# Patient Record
Sex: MALE | Race: BLACK OR AFRICAN AMERICAN | NOT HISPANIC OR LATINO
[De-identification: names, ages, dates, MRNs, and addresses within clinical notes are randomized per-mention and may not be internally consistent; named-entity substitution may affect disease eponyms.]

---

## 2022-12-04 ENCOUNTER — NON-APPOINTMENT (OUTPATIENT)
Age: 37
End: 2022-12-04

## 2023-01-26 ENCOUNTER — NON-APPOINTMENT (OUTPATIENT)
Age: 38
End: 2023-01-26

## 2023-11-15 ENCOUNTER — APPOINTMENT (OUTPATIENT)
Dept: ORTHOPEDIC SURGERY | Facility: CLINIC | Age: 38
End: 2023-11-15
Payer: OTHER MISCELLANEOUS

## 2023-11-15 ENCOUNTER — NON-APPOINTMENT (OUTPATIENT)
Age: 38
End: 2023-11-15

## 2023-11-15 VITALS — BODY MASS INDEX: 26.6 KG/M2 | WEIGHT: 190 LBS | HEIGHT: 71 IN

## 2023-11-15 DIAGNOSIS — I10 ESSENTIAL (PRIMARY) HYPERTENSION: ICD-10-CM

## 2023-11-15 DIAGNOSIS — M25.462 EFFUSION, LEFT KNEE: ICD-10-CM

## 2023-11-15 DIAGNOSIS — S83.8X2A SPRAIN OF OTHER SPECIFIED PARTS OF LEFT KNEE, INITIAL ENCOUNTER: ICD-10-CM

## 2023-11-15 PROBLEM — Z00.00 ENCOUNTER FOR PREVENTIVE HEALTH EXAMINATION: Status: ACTIVE | Noted: 2023-11-15

## 2023-11-15 PROCEDURE — L1833: CPT | Mod: LT

## 2023-11-15 PROCEDURE — 76882 US LMTD JT/FCL EVL NVASC XTR: CPT | Mod: 59

## 2023-11-15 PROCEDURE — 99204 OFFICE O/P NEW MOD 45 MIN: CPT

## 2023-11-15 PROCEDURE — 73564 X-RAY EXAM KNEE 4 OR MORE: CPT | Mod: LT

## 2023-11-15 PROCEDURE — 20611 DRAIN/INJ JOINT/BURSA W/US: CPT

## 2023-11-15 RX ORDER — AMLODIPINE BESYLATE 10 MG/1
10 TABLET ORAL
Refills: 0 | Status: ACTIVE | COMMUNITY

## 2023-11-28 ENCOUNTER — APPOINTMENT (OUTPATIENT)
Dept: MRI IMAGING | Facility: CLINIC | Age: 38
End: 2023-11-28
Payer: OTHER MISCELLANEOUS

## 2023-11-28 PROCEDURE — 73723 MRI JOINT LWR EXTR W/O&W/DYE: CPT | Mod: LT

## 2023-11-28 PROCEDURE — 27369Z: CUSTOM | Mod: 1L,LT

## 2023-11-28 PROCEDURE — 27369Z: CUSTOM | Mod: LT

## 2023-11-28 PROCEDURE — 73723 MRI JOINT LWR EXTR W/O&W/DYE: CPT | Mod: 1L,LT

## 2023-11-30 ENCOUNTER — APPOINTMENT (OUTPATIENT)
Dept: ORTHOPEDIC SURGERY | Facility: CLINIC | Age: 38
End: 2023-11-30
Payer: OTHER MISCELLANEOUS

## 2023-11-30 VITALS — WEIGHT: 190 LBS | BODY MASS INDEX: 26.6 KG/M2 | HEIGHT: 71 IN

## 2023-11-30 PROCEDURE — L1852: CPT | Mod: LT

## 2023-11-30 PROCEDURE — 99214 OFFICE O/P EST MOD 30 MIN: CPT

## 2023-12-21 ENCOUNTER — APPOINTMENT (OUTPATIENT)
Dept: ORTHOPEDIC SURGERY | Facility: CLINIC | Age: 38
End: 2023-12-21

## 2024-01-03 ENCOUNTER — APPOINTMENT (OUTPATIENT)
Dept: ORTHOPEDIC SURGERY | Facility: CLINIC | Age: 39
End: 2024-01-03
Payer: OTHER MISCELLANEOUS

## 2024-01-03 PROCEDURE — 99214 OFFICE O/P EST MOD 30 MIN: CPT

## 2024-01-03 NOTE — WORK
[Total (100%)] : total (100%) [Reveals pre-existing condition(s) that may affect treatment/prognosis] : reveals pre-existing condition(s) that may affect treatment/prognosis [Cannot return to work because ________] : cannot return to work because [unfilled] [Unknown at this time] : : unknown at this time [Patient] : patient [No Rx restrictions] : No Rx restrictions. [I provided the services listed above] :  I provided the services listed above. [FreeTextEntry1] : guarded needs surgery [FreeTextEntry2] : see HPI

## 2024-01-03 NOTE — HISTORY OF PRESENT ILLNESS
[Work related] : work related [Gradual] : gradual [7] : 7 [3] : 3 [Dull/Aching] : dull/aching [Throbbing] : throbbing [Constant] : constant [Rest] : rest [Not working due to injury] : Work status: not working due to injury [de-identified] : Patient has persistent symptoms in his left knee,  he has pain pivoting motions, stiffness fully extending the knee. We are awaiting auth for surgery left knee. He is wearing his ACL functional brace.  [] : no [FreeTextEntry1] : left knee  [FreeTextEntry3] : 11/12/23 [FreeTextEntry5] : patient was going to stop the elevator when he planted his foot on the ground and he felt his knee shift.  [FreeTextEntry6] : swelling.  [FreeTextEntry9] : brace  [de-identified] : bending the knee,

## 2024-01-03 NOTE — DISCUSSION/SUMMARY
[de-identified] : request comp auth for, surg disccsion questions answered, no guarantees and cpm post-op ice  mod activity  01/03/2024    RE:  SERGIO BECERRA   Acct #- [id number]    Attention:  Nurse Reviewer /Medical Director  I am writing this letter as a medical necessity for PT program. Patient has tried analgesics, non-steroid anti-inflammatory agents,  hot or cold compresses,injections of corticosteroids, etc)  which in combination or by themselves has not worked. Based on my patient's condition, I strongly believe that the PT is medically needed.   Thank you for your time and consideration.

## 2024-01-03 NOTE — PHYSICAL EXAM
[Left] : left knee [5___] : hamstring 5[unfilled]/5 [Positive] : positive Susana [] : patient ambulates with assistive device [de-identified] : knee immobilizer [TWNoteComboBox7] : flexion 110 degrees [de-identified] : extension 0 degrees

## 2024-02-05 ENCOUNTER — NON-APPOINTMENT (OUTPATIENT)
Age: 39
End: 2024-02-05

## 2024-02-05 ENCOUNTER — APPOINTMENT (OUTPATIENT)
Dept: ORTHOPEDIC SURGERY | Facility: CLINIC | Age: 39
End: 2024-02-05
Payer: OTHER MISCELLANEOUS

## 2024-02-05 VITALS — WEIGHT: 190 LBS | BODY MASS INDEX: 26.6 KG/M2 | HEIGHT: 71 IN

## 2024-02-05 PROCEDURE — 99213 OFFICE O/P EST LOW 20 MIN: CPT

## 2024-02-05 NOTE — HISTORY OF PRESENT ILLNESS
[de-identified] : Patient has persistent symptoms in his left knee,  he has pain pivoting motions, stiffness fully extending the knee. We are awaiting auth for surgery left knee. He is wearing his ACL functional brace.  No authorization as of yet.  [FreeTextEntry5] : Patient is here to follow up on left knee. Attending PT with improvement. Still experiences discomfort with prolonged standing/walking. Currently not working.

## 2024-02-05 NOTE — DISCUSSION/SUMMARY
[de-identified] : request comp auth for, surg disccsion questions answered, no guarantees and cpm post-op ice  mod activity  2/5/2024   RE:  SERGIO BECERRA   Acct #- [id number]    Attention:  Nurse Reviewer /Medical Director  I am writing this letter as a medical necessity for PT program. Patient has tried analgesics, non-steroid anti-inflammatory agents,  hot or cold compresses,injections of corticosteroids, etc)  which in combination or by themselves has not worked. Based on my patient's condition, I strongly believe that the PT is medically needed.   Thank you for your time and consideration.

## 2024-02-05 NOTE — PHYSICAL EXAM
[Left] : left knee [5___] : hamstring 5[unfilled]/5 [Positive] : positive Susana [] : patient ambulates with assistive device [de-identified] : knee immobilizer [TWNoteComboBox7] : flexion 110 degrees [de-identified] : extension 0 degrees

## 2024-03-04 ENCOUNTER — APPOINTMENT (OUTPATIENT)
Dept: ORTHOPEDIC SURGERY | Facility: CLINIC | Age: 39
End: 2024-03-04
Payer: OTHER MISCELLANEOUS

## 2024-03-04 ENCOUNTER — NON-APPOINTMENT (OUTPATIENT)
Age: 39
End: 2024-03-04

## 2024-03-04 PROCEDURE — 99214 OFFICE O/P EST MOD 30 MIN: CPT

## 2024-03-04 NOTE — PHYSICAL EXAM
[Left] : left knee [5___] : hamstring 5[unfilled]/5 [Positive] : positive Susana [] : no calf tenderness [de-identified] : knee immobilizer [de-identified] : extension 0 degrees [TWNoteComboBox7] : flexion 110 degrees

## 2024-03-04 NOTE — DISCUSSION/SUMMARY
[de-identified] : request comp auth for, surg L knee acl recon and med and lat meniscectomies ice  mod activity request comp auth for PT  3/4/2024   RE:  SERGIO BECERRA   Acct #- [id number]    Attention:  Nurse Reviewer /Medical Director  I am writing this letter as a medical necessity for PT program. Patient has tried analgesics, non-steroid anti-inflammatory agents,  hot or cold compresses,injections of corticosteroids, etc)  which in combination or by themselves has not worked. Based on my patient's condition, I strongly believe that the PT is medically needed.   Thank you for your time and consideration.

## 2024-03-04 NOTE — HISTORY OF PRESENT ILLNESS
[Gradual] : gradual [Sudden] : sudden [6] : 6 [5] : 5 [Stabbing] : stabbing [Intermittent] : intermittent [Rest] : rest [Exercising] : exercising [Not working due to injury] : Work status: not working due to injury [] : yes [de-identified] : Patient has persistent but tolerable symptoms in his left knee,. We are awaiting auth for surgery left knee. He is wearing his ACL functional brace.  No authorization as of yet. Has known MMT and LMT and acl tear [FreeTextEntry5] : Patient is here to follow up on left knee. Attending PT with improvement. Still experiences discomfort with prolonged standing/walking. Currently not working.  [FreeTextEntry3] : 11/12/23 [de-identified] :

## 2024-04-08 ENCOUNTER — NON-APPOINTMENT (OUTPATIENT)
Age: 39
End: 2024-04-08

## 2024-04-08 ENCOUNTER — APPOINTMENT (OUTPATIENT)
Dept: ORTHOPEDIC SURGERY | Facility: CLINIC | Age: 39
End: 2024-04-08
Payer: OTHER MISCELLANEOUS

## 2024-04-08 PROCEDURE — 99214 OFFICE O/P EST MOD 30 MIN: CPT

## 2024-04-08 NOTE — PHYSICAL EXAM
[Left] : left knee [5___] : hamstring 5[unfilled]/5 [Positive] : positive Susana [] : patient ambulates with assistive device [de-identified] : knee immobilizer [TWNoteComboBox7] : flexion 110 degrees [de-identified] : extension 0 degrees

## 2024-04-08 NOTE — DISCUSSION/SUMMARY
[de-identified] : request comp auth for, surg L knee acl recon and med and lat meniscectomies ice  mod activity request comp auth for PT to help increase strength LLE,  improve ADL's, function.  Continue ACL functional brace.    4/8/2024   RE:  SERGIO BECERRA   Acct #- [id number]    Attention:  Nurse Reviewer /Medical Director  I am writing this letter as a medical necessity for PT program. Patient has tried analgesics, non-steroid anti-inflammatory agents,  hot or cold compresses,injections of corticosteroids, etc)  which in combination or by themselves has not worked. Based on my patient's condition, I strongly believe that the PT is medically needed.   Thank you for your time and consideration.

## 2024-04-08 NOTE — HISTORY OF PRESENT ILLNESS
[Gradual] : gradual [Sudden] : sudden [6] : 6 [5] : 5 [Stabbing] : stabbing [Intermittent] : intermittent [Rest] : rest [Exercising] : exercising [Not working due to injury] : Work status: not working due to injury [] : yes [de-identified] : Patient has persistent but tolerable symptoms in his left knee. We are awaiting auth for surgery left knee. He is wearing his ACL functional brace.  No authorization as of yet. Has known MMT and LMT and acl tear. He has not been attending PT program. He was injured at work on 11/12/23, ACL tear, MMT, LMT. He has attended PT program and remains symptomatic. Has episodes of instability, still trouble with pivoting, feels weaker on the left side.  [FreeTextEntry3] : 11/12/23 [FreeTextEntry5] : Patient is here to follow up on left knee. Attending PT with improvement. Still experiences discomfort with prolonged standing/walking. Currently not working.  [de-identified] :

## 2024-05-06 ENCOUNTER — NON-APPOINTMENT (OUTPATIENT)
Age: 39
End: 2024-05-06

## 2024-05-06 ENCOUNTER — APPOINTMENT (OUTPATIENT)
Dept: ORTHOPEDIC SURGERY | Facility: CLINIC | Age: 39
End: 2024-05-06
Payer: OTHER MISCELLANEOUS

## 2024-05-06 VITALS — HEIGHT: 71 IN | BODY MASS INDEX: 26.6 KG/M2 | WEIGHT: 190 LBS

## 2024-05-06 DIAGNOSIS — S83.272A COMPLEX TEAR OF LATERAL MENISCUS, CURRENT INJURY, LEFT KNEE, INITIAL ENCOUNTER: ICD-10-CM

## 2024-05-06 PROCEDURE — 99214 OFFICE O/P EST MOD 30 MIN: CPT

## 2024-05-06 NOTE — DISCUSSION/SUMMARY
[de-identified] : request comp auth for, surg L knee acl recon and med and lat meniscectomies ice  mod activity request comp auth for PT to help increase strength LLE,  improve ADL's, function.  Continue ACL functional brace.   05/06/2024    RE:  SERGIO BECERRA   Acct #- [id number]    Attention:  Nurse Reviewer /Medical Director  I am writing this letter as a medical necessity for PT program. Patient has tried analgesics, non-steroid anti-inflammatory agents,  hot or cold compresses,injections of corticosteroids, etc)  which in combination or by themselves has not worked. Based on my patient's condition, I strongly believe that the PT is medically needed.   Thank you for your time and consideration.

## 2024-05-06 NOTE — PHYSICAL EXAM
[Left] : left knee [5___] : hamstring 5[unfilled]/5 [Positive] : positive Susana [] : patient ambulates with assistive device [de-identified] : knee immobilizer [TWNoteComboBox7] : flexion 110 degrees [de-identified] : extension 0 degrees

## 2024-05-06 NOTE — HISTORY OF PRESENT ILLNESS
[6] : 6 [1] : 2 [Dull/Aching] : dull/aching [Frequent] : frequent [Leisure] : leisure [Rest] : rest [Ice] : ice [Walking/activity] : walking/activity [Standing] : standing [Walking] : walking [Exercising] : exercising [Stairs] : stairs [de-identified] : DOA 11/12/23  Patient has persistent symptoms left knee, MMT, LMT, ACL tear. Not in PT as insurance discontinued, surgery has not been authorized. His knee is swollen today, walking for shorter distances increases the discomfort. Surgery not approved. Wears ACL functional brace when exercising.  [] : no [FreeTextEntry1] : Left Knee [de-identified] : 04/08/24 [de-identified] : Dr. Fisher

## 2024-05-14 ENCOUNTER — APPOINTMENT (OUTPATIENT)
Age: 39
End: 2024-05-14
Payer: OTHER MISCELLANEOUS

## 2024-05-14 PROCEDURE — 29880 ARTHRS KNE SRG MNISECTMY M&L: CPT | Mod: AS,59,RT

## 2024-05-14 PROCEDURE — 29888 ARTHRS AID ACL RPR/AGMNTJ: CPT | Mod: LT

## 2024-05-14 PROCEDURE — 29880 ARTHRS KNE SRG MNISECTMY M&L: CPT | Mod: 59,LT

## 2024-05-14 PROCEDURE — 29875 ARTHRS KNEE SURG SYNVCT LMTD: CPT | Mod: 59,LT

## 2024-05-14 PROCEDURE — 29888 ARTHRS AID ACL RPR/AGMNTJ: CPT | Mod: AS,RT

## 2024-05-14 PROCEDURE — 29875 ARTHRS KNEE SURG SYNVCT LMTD: CPT | Mod: AS,59,RT

## 2024-05-14 RX ORDER — OXYCODONE AND ACETAMINOPHEN 5; 325 MG/1; MG/1
5-325 TABLET ORAL
Qty: 40 | Refills: 0 | Status: ACTIVE | COMMUNITY
Start: 2024-05-14 | End: 1900-01-01

## 2024-05-22 ENCOUNTER — NON-APPOINTMENT (OUTPATIENT)
Age: 39
End: 2024-05-22

## 2024-05-22 ENCOUNTER — APPOINTMENT (OUTPATIENT)
Dept: ORTHOPEDIC SURGERY | Facility: CLINIC | Age: 39
End: 2024-05-22
Payer: OTHER MISCELLANEOUS

## 2024-05-22 DIAGNOSIS — S83.272D COMPLEX TEAR OF LATERAL MENISCUS, CURRENT INJURY, LEFT KNEE, SUBSEQUENT ENCOUNTER: ICD-10-CM

## 2024-05-22 DIAGNOSIS — S83.512D SPRAIN OF ANTERIOR CRUCIATE LIGAMENT OF LEFT KNEE, SUBSEQUENT ENCOUNTER: ICD-10-CM

## 2024-05-22 PROCEDURE — 73560 X-RAY EXAM OF KNEE 1 OR 2: CPT | Mod: LT

## 2024-05-22 PROCEDURE — 20610 DRAIN/INJ JOINT/BURSA W/O US: CPT | Mod: 58,LT

## 2024-05-22 PROCEDURE — 99024 POSTOP FOLLOW-UP VISIT: CPT

## 2024-05-22 NOTE — HISTORY OF PRESENT ILLNESS
[de-identified] : Patient s/p left knee arthroscopic ACL reconstruction with patellar tendon autograft, partial medial and lateral meniscectomy on 5/14/24. The patient denies fever/chills. No calf pain. PAin is manageable. At 90 degrees on the CPM.

## 2024-05-22 NOTE — PHYSICAL EXAM
[Left] : left knee [] : patient ambulates with assistive device [TWNoteComboBox7] : flexion 90 degrees [de-identified] : extension 0 degrees

## 2024-05-22 NOTE — DISCUSSION/SUMMARY
[de-identified] : Surgical findings reviewed with the patient. Start PT Program, HEP and CPM use reviewed.  05/22/2024    RE:  SERGIO BECERRA   Acct #- [id number]    Attention:  Nurse Reviewer /Medical Director  I am writing this letter as a medical necessity for PT program. Patient has tried analgesics, non-steroid anti-inflammatory agents,  hot or cold compresses,injections of corticosteroids, etc)  which in combination or by themselves has not worked. Based on my patient's condition, I strongly believe that the PT is medically needed.   Thank you for your time and consideration.

## 2024-05-22 NOTE — PROCEDURE
[Large Joint Injection] : Large joint injection [Left] : of the left [Knee] : knee [Pain] : pain [Inflammation] : inflammation [Betadine] : betadine [___ cc    0.25%] : Bupivacaine (Marcaine) ~Vcc of 0.25%  [Effusion] : effusion [] : Patient tolerated procedure well [Call if redness, pain or fever occur] : call if redness, pain or fever occur [Apply ice for 15min out of every hour for the next 12-24 hours as tolerated] : apply ice for 15 minutes out of every hour for the next 12-24 hours as tolerated [Patient was advised to rest the joint(s) for ____ days] : patient was advised to rest the joint(s) for [unfilled] days [de-identified] : 55 ccs [de-identified] : heme

## 2024-05-22 NOTE — WORK
[Total (100%)] : total (100%) [Does not reveal pre-existing condition(s) that may affect treatment/prognosis] : does not reveal pre-existing condition(s) that may affect treatment/prognosis [Cannot return to work because ________] : cannot return to work because [unfilled] [Unknown at this time] : : unknown at this time [Patient] : patient [No Rx restrictions] : No Rx restrictions. [I provided the services listed above] :  I provided the services listed above. [FreeTextEntry1] : good with PT

## 2024-06-03 ENCOUNTER — APPOINTMENT (OUTPATIENT)
Dept: ORTHOPEDIC SURGERY | Facility: CLINIC | Age: 39
End: 2024-06-03

## 2024-06-26 ENCOUNTER — NON-APPOINTMENT (OUTPATIENT)
Age: 39
End: 2024-06-26

## 2024-06-26 ENCOUNTER — APPOINTMENT (OUTPATIENT)
Dept: ORTHOPEDIC SURGERY | Facility: CLINIC | Age: 39
End: 2024-06-26
Payer: OTHER MISCELLANEOUS

## 2024-06-26 VITALS — WEIGHT: 190 LBS | BODY MASS INDEX: 26.6 KG/M2 | HEIGHT: 71 IN

## 2024-06-26 DIAGNOSIS — S83.242A OTHER TEAR OF MEDIAL MENISCUS, CURRENT INJURY, LEFT KNEE, INITIAL ENCOUNTER: ICD-10-CM

## 2024-06-26 DIAGNOSIS — S83.512A SPRAIN OF ANTERIOR CRUCIATE LIGAMENT OF LEFT KNEE, INITIAL ENCOUNTER: ICD-10-CM

## 2024-06-26 DIAGNOSIS — S83.242D OTHER TEAR OF MEDIAL MENISCUS, CURRENT INJURY, LEFT KNEE, SUBSEQUENT ENCOUNTER: ICD-10-CM

## 2024-06-26 PROCEDURE — 99024 POSTOP FOLLOW-UP VISIT: CPT

## 2024-08-14 ENCOUNTER — APPOINTMENT (OUTPATIENT)
Dept: ORTHOPEDIC SURGERY | Facility: CLINIC | Age: 39
End: 2024-08-14
Payer: OTHER MISCELLANEOUS

## 2024-08-14 VITALS — WEIGHT: 190 LBS | HEIGHT: 71 IN | BODY MASS INDEX: 26.6 KG/M2

## 2024-08-14 DIAGNOSIS — S83.242D OTHER TEAR OF MEDIAL MENISCUS, CURRENT INJURY, LEFT KNEE, SUBSEQUENT ENCOUNTER: ICD-10-CM

## 2024-08-14 DIAGNOSIS — S83.512D SPRAIN OF ANTERIOR CRUCIATE LIGAMENT OF LEFT KNEE, SUBSEQUENT ENCOUNTER: ICD-10-CM

## 2024-08-14 DIAGNOSIS — S83.272D COMPLEX TEAR OF LATERAL MENISCUS, CURRENT INJURY, LEFT KNEE, SUBSEQUENT ENCOUNTER: ICD-10-CM

## 2024-08-14 PROCEDURE — 99213 OFFICE O/P EST LOW 20 MIN: CPT

## 2024-08-14 NOTE — PHYSICAL EXAM
[Left] : left knee [] : ambulation with crutches [TWNoteComboBox7] : flexion 110 degrees [de-identified] : extension 0 degrees

## 2024-08-14 NOTE — HISTORY OF PRESENT ILLNESS
[Work related] : work related [Sudden] : sudden [0] : 0 [Dull/Aching] : dull/aching [Intermittent] : intermittent [Rest] : rest [Stairs] : stairs [Not working due to injury] : Work status: not working due to injury [de-identified] : Patient s/p left knee arthroscopic ACL reconstruction with patellar tendon autograft, partial medial and lateral meniscectomy on 5/14/24. The patient is in PT and uses ACL functional brace, doing HEP.  [] : no [FreeTextEntry1] : LT KNEE  [FreeTextEntry3] : 11/12/23 [FreeTextEntry5] : Patient feeling a lot better.  [de-identified] : 05/14/24 [de-identified] : partial medial and lateral meniscectomy

## 2024-08-14 NOTE — DISCUSSION/SUMMARY
[de-identified] : PT program, advance   8/14/24   RE:  SERGIO BECERRA   [id number]    Attention:  Nurse Reviewer /Medical Director  I am writing this letter as a medical necessity for PT program. Patient has tried analgesics, non-steroid anti-inflammatory agents,  hot or cold compresses,injections of corticosteroids, etc)  which in combination or by themselves has not worked. Based on my patient's condition, I strongly believe that the PT is medically needed.   Thank you for your time and consideration.

## 2024-09-11 ENCOUNTER — NON-APPOINTMENT (OUTPATIENT)
Age: 39
End: 2024-09-11

## 2024-09-11 ENCOUNTER — APPOINTMENT (OUTPATIENT)
Dept: ORTHOPEDIC SURGERY | Facility: CLINIC | Age: 39
End: 2024-09-11
Payer: OTHER MISCELLANEOUS

## 2024-09-11 VITALS — HEIGHT: 71 IN | WEIGHT: 190 LBS | BODY MASS INDEX: 26.6 KG/M2

## 2024-09-11 DIAGNOSIS — S83.242A OTHER TEAR OF MEDIAL MENISCUS, CURRENT INJURY, LEFT KNEE, INITIAL ENCOUNTER: ICD-10-CM

## 2024-09-11 DIAGNOSIS — S83.512A SPRAIN OF ANTERIOR CRUCIATE LIGAMENT OF LEFT KNEE, INITIAL ENCOUNTER: ICD-10-CM

## 2024-09-11 DIAGNOSIS — S83.272A COMPLEX TEAR OF LATERAL MENISCUS, CURRENT INJURY, LEFT KNEE, INITIAL ENCOUNTER: ICD-10-CM

## 2024-09-11 PROCEDURE — 99214 OFFICE O/P EST MOD 30 MIN: CPT

## 2024-09-11 NOTE — DISCUSSION/SUMMARY
[de-identified] : PT program, advance   9/11/24   RE[facility]  [facility] BECERRA   [id number]    Attention:  Nurse Reviewer /Medical Director  I am writing this letter as a medical necessity for PT program. Patient has tried analgesics, non-steroid anti-inflammatory agents,  hot or cold compresses,injections of corticosteroids, etc)  which in combination or by themselves has not worked. Based on my patient's condition, I strongly believe that the PT is medically needed.   Thank you for your time and consideration.

## 2024-09-11 NOTE — HISTORY OF PRESENT ILLNESS
[Work related] : work related [Sudden] : sudden [0] : 0 [Dull/Aching] : dull/aching [Intermittent] : intermittent [Rest] : rest [Stairs] : stairs [Not working due to injury] : Work status: not working due to injury [de-identified] : Patient s/p left knee arthroscopic ACL reconstruction with patellar tendon autograft, partial medial and lateral meniscectomy on 5/14/24. The patient is in PT and uses ACL functional brace, doing HEP.  [] : no [FreeTextEntry1] : LT KNEE  [FreeTextEntry5] : Patient feeling a lot better.  [FreeTextEntry3] : 11/12/23 [de-identified] : 05/14/24 [de-identified] : partial medial and lateral meniscectomy

## 2024-09-11 NOTE — PHYSICAL EXAM
[Left] : left knee [5___] : quadriceps 5[unfilled]/5 [] : ambulation with crutches [TWNoteComboBox7] : flexion 115 degrees [de-identified] : extension 0 degrees

## 2024-09-11 NOTE — WORK
[Total (100%)] : total (100%) [Does not reveal pre-existing condition(s) that may affect treatment/prognosis] : does not reveal pre-existing condition(s) that may affect treatment/prognosis [Cannot return to work because ________] : cannot return to work because [unfilled] [Unknown at this time] : : unknown at this time [Patient] : patient [No Rx restrictions] : No Rx restrictions. [I provided the services listed above] :  I provided the services listed above. [FreeTextEntry1] : good with more PT

## 2024-10-23 ENCOUNTER — APPOINTMENT (OUTPATIENT)
Dept: ORTHOPEDIC SURGERY | Facility: CLINIC | Age: 39
End: 2024-10-23
Payer: OTHER MISCELLANEOUS

## 2024-10-23 VITALS — BODY MASS INDEX: 26.6 KG/M2 | HEIGHT: 71 IN | WEIGHT: 190 LBS

## 2024-10-23 DIAGNOSIS — S83.242A OTHER TEAR OF MEDIAL MENISCUS, CURRENT INJURY, LEFT KNEE, INITIAL ENCOUNTER: ICD-10-CM

## 2024-10-23 DIAGNOSIS — S83.512D SPRAIN OF ANTERIOR CRUCIATE LIGAMENT OF LEFT KNEE, SUBSEQUENT ENCOUNTER: ICD-10-CM

## 2024-10-23 DIAGNOSIS — S83.272A COMPLEX TEAR OF LATERAL MENISCUS, CURRENT INJURY, LEFT KNEE, INITIAL ENCOUNTER: ICD-10-CM

## 2024-10-23 PROCEDURE — 99214 OFFICE O/P EST MOD 30 MIN: CPT

## 2024-12-04 ENCOUNTER — APPOINTMENT (OUTPATIENT)
Dept: ORTHOPEDIC SURGERY | Facility: CLINIC | Age: 39
End: 2024-12-04
Payer: OTHER MISCELLANEOUS

## 2024-12-04 VITALS — HEIGHT: 71 IN | WEIGHT: 190 LBS | BODY MASS INDEX: 26.6 KG/M2

## 2024-12-04 DIAGNOSIS — S83.512A SPRAIN OF ANTERIOR CRUCIATE LIGAMENT OF LEFT KNEE, INITIAL ENCOUNTER: ICD-10-CM

## 2024-12-04 DIAGNOSIS — S83.242A OTHER TEAR OF MEDIAL MENISCUS, CURRENT INJURY, LEFT KNEE, INITIAL ENCOUNTER: ICD-10-CM

## 2024-12-04 DIAGNOSIS — S83.272A COMPLEX TEAR OF LATERAL MENISCUS, CURRENT INJURY, LEFT KNEE, INITIAL ENCOUNTER: ICD-10-CM

## 2024-12-04 PROCEDURE — 99214 OFFICE O/P EST MOD 30 MIN: CPT

## 2024-12-05 ENCOUNTER — APPOINTMENT (OUTPATIENT)
Dept: ORTHOPEDIC SURGERY | Facility: CLINIC | Age: 39
End: 2024-12-05

## 2024-12-09 ENCOUNTER — NON-APPOINTMENT (OUTPATIENT)
Age: 39
End: 2024-12-09

## 2024-12-18 ENCOUNTER — APPOINTMENT (OUTPATIENT)
Dept: ORTHOPEDIC SURGERY | Facility: CLINIC | Age: 39
End: 2024-12-18

## 2025-01-15 ENCOUNTER — APPOINTMENT (OUTPATIENT)
Dept: ORTHOPEDIC SURGERY | Facility: CLINIC | Age: 40
End: 2025-01-15
Payer: OTHER MISCELLANEOUS

## 2025-01-15 VITALS — BODY MASS INDEX: 26.6 KG/M2 | WEIGHT: 190 LBS | HEIGHT: 71 IN

## 2025-01-15 DIAGNOSIS — S83.512A SPRAIN OF ANTERIOR CRUCIATE LIGAMENT OF LEFT KNEE, INITIAL ENCOUNTER: ICD-10-CM

## 2025-01-15 DIAGNOSIS — S83.272A COMPLEX TEAR OF LATERAL MENISCUS, CURRENT INJURY, LEFT KNEE, INITIAL ENCOUNTER: ICD-10-CM

## 2025-01-15 DIAGNOSIS — S83.242A OTHER TEAR OF MEDIAL MENISCUS, CURRENT INJURY, LEFT KNEE, INITIAL ENCOUNTER: ICD-10-CM

## 2025-01-15 PROCEDURE — 99214 OFFICE O/P EST MOD 30 MIN: CPT

## 2025-02-26 ENCOUNTER — APPOINTMENT (OUTPATIENT)
Dept: ORTHOPEDIC SURGERY | Facility: CLINIC | Age: 40
End: 2025-02-26
Payer: OTHER MISCELLANEOUS

## 2025-02-26 VITALS — WEIGHT: 190 LBS | BODY MASS INDEX: 26.6 KG/M2 | HEIGHT: 71 IN

## 2025-02-26 DIAGNOSIS — S83.512A SPRAIN OF ANTERIOR CRUCIATE LIGAMENT OF LEFT KNEE, INITIAL ENCOUNTER: ICD-10-CM

## 2025-02-26 DIAGNOSIS — S83.272D COMPLEX TEAR OF LATERAL MENISCUS, CURRENT INJURY, LEFT KNEE, SUBSEQUENT ENCOUNTER: ICD-10-CM

## 2025-02-26 PROCEDURE — 99214 OFFICE O/P EST MOD 30 MIN: CPT

## 2025-04-01 ENCOUNTER — APPOINTMENT (OUTPATIENT)
Dept: ORTHOPEDIC SURGERY | Facility: CLINIC | Age: 40
End: 2025-04-01
Payer: OTHER MISCELLANEOUS

## 2025-04-01 DIAGNOSIS — S83.242A OTHER TEAR OF MEDIAL MENISCUS, CURRENT INJURY, LEFT KNEE, INITIAL ENCOUNTER: ICD-10-CM

## 2025-04-01 DIAGNOSIS — S83.512D SPRAIN OF ANTERIOR CRUCIATE LIGAMENT OF LEFT KNEE, SUBSEQUENT ENCOUNTER: ICD-10-CM

## 2025-04-01 PROCEDURE — 99455 WORK RELATED DISABILITY EXAM: CPT
